# Patient Record
(demographics unavailable — no encounter records)

---

## 2025-01-21 NOTE — DISCUSSION/SUMMARY
[FreeTextEntry1] : 8 yr old male with nausea and vomiting, likely acute gastro In order to maintain hydration consume "oral rehydration solution," such as Pedialyte or low calorie sports drinks. If vomiting, try to give child a few teaspoons of fluid every few minutes. Avoid drinking juice or soda. These can make diarrhea worse. If tolerating solids, its best to consume lean meats, fruits, vegetables, and whole-grain breads and cereals. Avoid eating foods with a lot of fat or sugar, which can make symptoms worse. Return if symptoms worsen or persist  Sibling also strep positive, pt rapid strep negative, f/u with throat culture All questions answered. Caretaker verbalizes understanding and agrees with plan of care.

## 2025-01-21 NOTE — HISTORY OF PRESENT ILLNESS
[GI Symptoms] : GI SYMPTOMS [___ Day(s)] : [unfilled] day(s) [Constant] : constant [# of episodes of vomit: ___] : Number of episodes of vomit: [unfilled] [Hx of recent COVID-19 infection] : no history of recent COVID-19 infection [Sick Contacts: ___] : sick contacts: [unfilled] [Change in diet] : no change in diet [Ate out] : did not eat out [Recent travel: ___] : no recent travel [Recent Antibiotic Use] : no recent antibiotic use [Known Exposure to COVID-19] : no known exposure to COVID-19 [Generalized] : generalized [With Food] : with food [Oral Rehydration Solution] : oral rehydration solution [Fever] : no fever [Decreased Appetite] : decreased appetite [Nausea] : nausea [Vomiting] : vomiting [Abdominal Pain] : abdominal pain

## 2025-02-06 NOTE — HISTORY OF PRESENT ILLNESS
[de-identified] : sore throat [FreeTextEntry6] : 8  year old pt with 1 day h/o sore throat and no fever.  NL po NL uop No rash Active Alert and Playful

## 2025-02-06 NOTE — PHYSICAL EXAM
[Erythematous Oropharynx] : erythematous oropharynx [NL] : moves all extremities x4, warm, well perfused x4 [de-identified] : dry skin on arms and legs

## 2025-02-06 NOTE — DISCUSSION/SUMMARY
[FreeTextEntry1] : Rapid strep and flu negative. Discussed with guardian results and the need to send out a throat culture. Patient likely with viral pharyngitis. Supportive Care advised with the  use of antipyretics for pain or fever. Throat culture takes 48-72 hours.  Will call if results are positive for bacteria and will start antibiotics. If symptoms worsen follow up sooner. Eczema Recommend daily moisturizer and topical steroid as needed. Side effect of topical steroids includes but not limited to lightening of skin. Avoid synthetic clothing. Bathe every 2-3 days, avoiding hot water.  Sleep with cool mist humidifier.

## 2025-03-01 NOTE — DISCUSSION/SUMMARY
[FreeTextEntry1] : intermittent right sided chest pain today. Discussed with mom and nurse from school the unlikely heart rate readings. Nurse in school also felt it was unlikely but since she had it done several times over the last few hours she was concerned enough to call mom.  At this time if he had 2-3 episodes of very slow heart rate with chest pain- I recommend seeing a cardiologist to just verify he does not have a heart block or bradycardia. However, since his exam and vitals are WNL I don't see a reason to stop him from participating in any physical activity.  All questions answered. Caretaker understands and agrees with plan.

## 2025-03-01 NOTE — HISTORY OF PRESENT ILLNESS
[___ Hour(s)] : [unfilled] hour(s) [Intermittent] : intermittent [de-identified] : chest pain [FreeTextEntry7] : right side [FreeTextEntry3] : was having lunch and drinking water.

## 2025-03-01 NOTE — HISTORY OF PRESENT ILLNESS
[___ Hour(s)] : [unfilled] hour(s) [Intermittent] : intermittent [de-identified] : chest pain [FreeTextEntry7] : right side [FreeTextEntry3] : was having lunch and drinking water.

## 2025-05-11 NOTE — PHYSICAL EXAM
[Alert] : alert [No Acute Distress] : no acute distress [Normocephalic] : normocephalic [Conjunctivae with no discharge] : conjunctivae with no discharge [PERRL] : PERRL [EOMI Bilateral] : EOMI bilateral [Auricles Well Formed] : auricles well formed [Clear Tympanic membranes with present light reflex and bony landmarks] : clear tympanic membranes with present light reflex and bony landmarks [No Discharge] : no discharge [Nares Patent] : nares patent [Pink Nasal Mucosa] : pink nasal mucosa [Palate Intact] : palate intact [Nonerythematous Oropharynx] : nonerythematous oropharynx [Supple, full passive range of motion] : supple, full passive range of motion [Symmetric Chest Rise] : symmetric chest rise [Clear to Auscultation Bilaterally] : clear to auscultation bilaterally [Regular Rate and Rhythm] : regular rate and rhythm [Normal S1, S2 present] : normal S1, S2 present [No Murmurs] : no murmurs [Soft] : soft [NonTender] : non tender [Non Distended] : non distended [Normoactive Bowel Sounds] : normoactive bowel sounds [No Hepatomegaly] : no hepatomegaly [No Splenomegaly] : no splenomegaly [Saqib: _____] : Saqib [unfilled] [Testicles Descended Bilaterally] : testicles descended bilaterally [Patent] : patent [No fissures] : no fissures [No Gait Asymmetry] : no gait asymmetry [No pain or deformities with palpation of bone, muscles, joints] : no pain or deformities with palpation of bone, muscles, joints [Normal Muscle Tone] : normal muscle tone [Straight] : straight [Cranial Nerves Grossly Intact] : cranial nerves grossly intact [No Rash or Lesions] : no rash or lesions

## 2025-05-11 NOTE — HISTORY OF PRESENT ILLNESS
[Mother] : mother [Fruit] : fruit [Vegetables] : vegetables [Meat] : meat [Grains] : grains [Eggs] : eggs [Dairy] : dairy [___ stools per day] : [unfilled]  stools per day [Firm] : stools are firm consistency [___ voids per day] : [unfilled] voids per day [Normal] : Normal [In own bed] : In own bed [Sleeps ___ hours per night] : sleeps [unfilled] hours per night [Brushing teeth twice/d] : brushing teeth twice per day [Tap water] : Primary Fluoride Source: Tap water [Playtime (60 min/d)] : playtime 60 min a day [Participates in after-school activities] : participates in after-school activities [Appropiate parent-child-sibling interaction] : appropriate parent-child-sibling interaction [Does chores when asked] : does chores when asked [Has Friends] : has friends [Has chance to make own decisions] : has chance to make own decisions [Grade ___] : Grade [unfilled] [Adequate social interactions] : adequate social interactions [Adequate behavior] : adequate behavior [Adequate performance] : adequate performance [Adequate attention] : adequate attention [No] : No cigarette smoke exposure [Appropriately restrained in motor vehicle] : appropriately restrained in motor vehicle [Supervised outdoor play] : supervised outdoor play [Supervised around water] : supervised around water [Wears helmet and pads] : wears helmet and pads [Parent knows child's friends] : parent knows child's friends [Parent discusses safety rules regarding adults] : parent discusses safety rules regarding adults [Up to date] : Up to date [NO] : No [FreeTextEntry7] : Nine-year-old male presents for well check visit. Has been doing well since the last visit.  [de-identified] : (1) Has dry skin in general and likes to take hot baths. Trying to reduce hot baths and apply moisturizer when possible. (2) Recently had episodes of right sided chest pain. Described as sharp pain that was lasting a few seconds. Concerned with HR from nurse's note earlier this year, and will be seeing Pediatric Cardiology team next month.  [FreeTextEntry9] : Participates in flag football. Doing well.  [de-identified] : Doing well in third grade.

## 2025-05-11 NOTE — DISCUSSION/SUMMARY
[Normal Growth] : growth [Normal Development] : development [No Elimination Concerns] : elimination [No Feeding Concerns] : feeding [No Skin Concerns] : skin [Normal Sleep Pattern] : sleep [School] : school [Development and Mental Health] : development and mental health [Nutrition and Physical Activity] : nutrition and physical activity [Oral Health] : oral health [Safety] : safety [Patient] : patient [Mother] : mother [Father] : father [Full Activity without restrictions including Physical Education & Athletics] : Full Activity without restrictions including Physical Education & Athletics [I have examined the above-named student and completed the preparticipation physical evaluation. The athlete does not present apparent clinical contraindications to practice and participate in sport(s) as outlined above. A copy of the physical exam is on r] : I have examined the above-named student and completed the preparticipation physical evaluation. The athlete does not present apparent clinical contraindications to practice and participate in sport(s) as outlined above. A copy of the physical exam is on record in my office and can be made available to the school at the request of the parents. If conditions arise after the athlete has been cleared for participation, the physician may rescind the clearance until the problem is resolved and the potential consequences are completely explained to the athlete (and parents/guardians). [FreeTextEntry1] : Nine-year-old male growing and developing well.  Continue balanced diet with all food groups. Brush teeth twice a day with toothbrush. Recommend visit to dentist. Help child to maintain consistent daily routines and sleep schedule. School discussed. Ensure home is safe. Teach child about personal safety. Use consistent, positive discipline. Limit screen time to no more than 2 hours per day. Encourage physical activity. Child needs to ride in a belt-positioning booster seat until 4 feet 9 inches has been reached and are between 8 and 12 years of age.  Will follow-up in one year for next well check visit.

## 2025-05-14 NOTE — CARDIOLOGY SUMMARY
[de-identified] : 05/13/2025  [FreeTextEntry1] : Normal sinus rhythm without preexcitation or ectopy. Heart rate (bpm): 73 [de-identified] : 05/13/2025  [FreeTextEntry2] : 1. Normal left ventricular size, morphology and systolic function. 2. Normal right ventricular morphology with qualitatively normal size and systolic function. 3. Trivial tricuspid valve regurgitation, peak systolic instantaneous gradient 17.1 mmHg. 4. No pericardial effusion.

## 2025-05-14 NOTE — HISTORY OF PRESENT ILLNESS
[FreeTextEntry1] : LARRY is a 9 year male who presents for cardiac evaluation of  intermittent sharp chest pains that have been occurring for approximately two years. The pain is described as a sharp, poking sensation localized to the right upper chest. Episodes occur randomly, not associated with physical activity, and last for about 20-30 seconds. The pain is exacerbated by movement during an episode. Larry denies associated symptoms such as dizziness or nausea. A recent incident at school reported low heart rate(reportedly in 20s -30s), prompting further evaluation. Previous cardiac workup for a small VSD reportedly was normal. There is no family history of first degree relatives with congenital heart disease, sudden cardiac death or arrhythmia.

## 2025-05-14 NOTE — DISCUSSION/SUMMARY
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 He has trivial tricuspid regurgitation which is within normal limits and estimates normal pulmonary artery pressures.  The chest pain described is not related to a cardiac abnormality.  I reassured MILADIS and his family that the MILADIS's heart is structurally and functionally normal. I believe that the HR reported by the school nurse was erroneous as a HR f 20-30 in an otherwise healthy 9 year old would elicit significant symptoms. Given the lack of history of dizziness, syncope, etc, I do not feel that further evaluation is indicated at this time. However, if any of the above symptoms occur, a Holter monitor will be ordered in the future.  All physical activities may be performed without restriction and there is no need for routine follow-up unless future concerns arise.  [Needs SBE Prophylaxis] : [unfilled] does not need bacterial endocarditis prophylaxis [PE + No Restrictions] : [unfilled] may participate in the entire physical education program without restriction, including all varsity competitive sports.

## 2025-05-14 NOTE — CONSULT LETTER
[Today's Date] : [unfilled] [Name] : Name: [unfilled] [] : : ~~ [Today's Date:] : [unfilled] [Dear  ___:] : Dear Dr. [unfilled]: [Consult] : I had the pleasure of evaluating your patient, [unfilled]. My full evaluation follows. [Consult - Single Provider] : Thank you very much for allowing me to participate in the care of this patient. If you have any questions, please do not hesitate to contact me. [Sincerely,] : Sincerely, [FreeTextEntry4] : TOR HERNANDEZ MD [FreeTextEntry5] : [facility]  [FreeTextEntry6] : [facility]  [de-identified] : Sherman Casas MD, FAAP, FACC  Pediatric Cardiologist  of Pediatrics [facility]

## 2025-05-14 NOTE — CARDIOLOGY SUMMARY
[de-identified] : 05/13/2025  [FreeTextEntry1] : Normal sinus rhythm without preexcitation or ectopy. Heart rate (bpm): 73 [de-identified] : 05/13/2025  [FreeTextEntry2] : 1. Normal left ventricular size, morphology and systolic function. 2. Normal right ventricular morphology with qualitatively normal size and systolic function. 3. Trivial tricuspid valve regurgitation, peak systolic instantaneous gradient 17.1 mmHg. 4. No pericardial effusion.

## 2025-05-14 NOTE — HISTORY OF PRESENT ILLNESS
[FreeTextEntry1] : LARRY is a 9 year male who presents for cardiac evaluation of  intermittent sharp chest pains that have been occurring for approximately two years. The pain is described as a sharp, poking sensation localized to the right upper chest. Episodes occur randomly, not associated with physical activity, and last for about 20-30 seconds. The pain is exacerbated by movement during an episode. Larry denies associated symptoms such as dizziness or nausea. A recent incident at school reported low heart rate(reportedly in 20s -30s), prompting further evaluation. Previous cardiac workup for a small VSD reportedly was normal. There is no family history of first degree relatives with congenital heart disease, sudden cardiac death or arrhythmia.  0 = swallows foods/liquids without difficulty

## 2025-05-14 NOTE — CONSULT LETTER
[Today's Date] : [unfilled] [Name] : Name: [unfilled] [] : : ~~ [Today's Date:] : [unfilled] [Dear  ___:] : Dear Dr. [unfilled]: [Consult] : I had the pleasure of evaluating your patient, [unfilled]. My full evaluation follows. [Consult - Single Provider] : Thank you very much for allowing me to participate in the care of this patient. If you have any questions, please do not hesitate to contact me. [Sincerely,] : Sincerely, [FreeTextEntry4] : TOR HERNANDEZ MD [FreeTextEntry5] : [facility]  [FreeTextEntry6] : [facility]  [de-identified] : Sherman Casas MD, FAAP, FACC  Pediatric Cardiologist  of Pediatrics [facility]

## 2025-05-14 NOTE — CONSULT LETTER
[Today's Date] : [unfilled] [Name] : Name: [unfilled] [] : : ~~ [Today's Date:] : [unfilled] [Dear  ___:] : Dear Dr. [unfilled]: [Consult] : I had the pleasure of evaluating your patient, [unfilled]. My full evaluation follows. [Consult - Single Provider] : Thank you very much for allowing me to participate in the care of this patient. If you have any questions, please do not hesitate to contact me. [Sincerely,] : Sincerely, [FreeTextEntry4] : TOR HERNANDEZ MD [FreeTextEntry5] : [facility]  [FreeTextEntry6] : [facility]  [de-identified] : Sherman Casas MD, FAAP, FACC  Pediatric Cardiologist  of Pediatrics [facility]

## 2025-05-14 NOTE — CARDIOLOGY SUMMARY
[de-identified] : 05/13/2025  [FreeTextEntry1] : Normal sinus rhythm without preexcitation or ectopy. Heart rate (bpm): 73 [de-identified] : 05/13/2025  [FreeTextEntry2] : 1. Normal left ventricular size, morphology and systolic function. 2. Normal right ventricular morphology with qualitatively normal size and systolic function. 3. Trivial tricuspid valve regurgitation, peak systolic instantaneous gradient 17.1 mmHg. 4. No pericardial effusion.